# Patient Record
Sex: FEMALE | Race: WHITE | NOT HISPANIC OR LATINO | RURAL
[De-identification: names, ages, dates, MRNs, and addresses within clinical notes are randomized per-mention and may not be internally consistent; named-entity substitution may affect disease eponyms.]

---

## 2021-03-22 RX ORDER — CYCLOBENZAPRINE HCL 10 MG
10 TABLET ORAL 3 TIMES DAILY PRN
Qty: 90 TABLET | Refills: 5 | Status: SHIPPED | OUTPATIENT
Start: 2021-03-22 | End: 2021-04-01

## 2021-08-02 ENCOUNTER — OFFICE VISIT (OUTPATIENT)
Dept: FAMILY MEDICINE | Facility: CLINIC | Age: 51
End: 2021-08-02
Payer: OTHER GOVERNMENT

## 2021-08-02 DIAGNOSIS — Z11.52 ENCOUNTER FOR SCREENING FOR COVID-19: Primary | ICD-10-CM

## 2021-08-02 PROCEDURE — 99202 OFFICE O/P NEW SF 15 MIN: CPT | Mod: GC,,, | Performed by: FAMILY MEDICINE

## 2021-08-02 PROCEDURE — 87635 SARS-COV-2 (COVID-19) QUALITATIVE PCR: ICD-10-PCS | Mod: ,,, | Performed by: CLINICAL MEDICAL LABORATORY

## 2021-08-02 PROCEDURE — 87635 SARS-COV-2 COVID-19 AMP PRB: CPT | Mod: ,,, | Performed by: CLINICAL MEDICAL LABORATORY

## 2021-08-02 PROCEDURE — 99202 PR OFFICE/OUTPT VISIT, NEW, LEVL II, 15-29 MIN: ICD-10-PCS | Mod: GC,,, | Performed by: FAMILY MEDICINE

## 2021-08-03 LAB — SARS-COV-2 RNA RESP QL NAA+PROBE: NEGATIVE

## 2021-12-02 RX ORDER — NEBIVOLOL 5 MG/1
5 TABLET ORAL DAILY
Qty: 90 TABLET | Refills: 4 | Status: SHIPPED | OUTPATIENT
Start: 2021-12-02 | End: 2022-10-13 | Stop reason: SDUPTHER

## 2021-12-02 RX ORDER — VALSARTAN AND HYDROCHLOROTHIAZIDE 320; 12.5 MG/1; MG/1
1 TABLET, FILM COATED ORAL DAILY
Qty: 90 TABLET | Refills: 3 | Status: SHIPPED | OUTPATIENT
Start: 2021-12-02 | End: 2022-10-13 | Stop reason: SDUPTHER

## 2022-10-13 DIAGNOSIS — I10 HYPERTENSION, UNSPECIFIED TYPE: Primary | ICD-10-CM

## 2022-10-13 RX ORDER — VALSARTAN AND HYDROCHLOROTHIAZIDE 320; 12.5 MG/1; MG/1
1 TABLET, FILM COATED ORAL DAILY
Qty: 90 TABLET | Refills: 3 | Status: SHIPPED | OUTPATIENT
Start: 2022-10-13 | End: 2023-04-03 | Stop reason: SDUPTHER

## 2022-10-13 RX ORDER — NEBIVOLOL 5 MG/1
5 TABLET ORAL DAILY
Qty: 90 TABLET | Refills: 4 | Status: SHIPPED | OUTPATIENT
Start: 2022-10-13 | End: 2023-04-03 | Stop reason: SDUPTHER

## 2022-10-19 ENCOUNTER — OFFICE VISIT (OUTPATIENT)
Dept: PRIMARY CARE CLINIC | Facility: CLINIC | Age: 52
End: 2022-10-19
Payer: COMMERCIAL

## 2022-10-19 VITALS
WEIGHT: 190 LBS | DIASTOLIC BLOOD PRESSURE: 88 MMHG | RESPIRATION RATE: 18 BRPM | HEART RATE: 86 BPM | HEIGHT: 64 IN | OXYGEN SATURATION: 98 % | BODY MASS INDEX: 32.44 KG/M2 | SYSTOLIC BLOOD PRESSURE: 138 MMHG

## 2022-10-19 DIAGNOSIS — Z23 NEED FOR VACCINATION: Primary | ICD-10-CM

## 2022-10-19 DIAGNOSIS — Z00.00 ROUTINE GENERAL MEDICAL EXAMINATION AT A HEALTH CARE FACILITY: ICD-10-CM

## 2022-10-19 PROCEDURE — 90686 PR FLU VACCINE, QIIV4, NO PRSV, 0.5 ML, IM: ICD-10-PCS | Mod: ,,, | Performed by: FAMILY MEDICINE

## 2022-10-19 PROCEDURE — 90686 IIV4 VACC NO PRSV 0.5 ML IM: CPT | Mod: ,,, | Performed by: FAMILY MEDICINE

## 2022-10-19 PROCEDURE — 90471 FLU VACCINE (QUAD) GREATER THAN OR EQUAL TO 3YO PRESERVATIVE FREE IM: ICD-10-PCS | Mod: ,,, | Performed by: FAMILY MEDICINE

## 2022-10-19 PROCEDURE — 1159F MED LIST DOCD IN RCRD: CPT | Mod: ,,, | Performed by: FAMILY MEDICINE

## 2022-10-19 PROCEDURE — 3075F PR MOST RECENT SYSTOLIC BLOOD PRESS GE 130-139MM HG: ICD-10-PCS | Mod: ,,, | Performed by: FAMILY MEDICINE

## 2022-10-19 PROCEDURE — 99396 PREV VISIT EST AGE 40-64: CPT | Mod: 25,,, | Performed by: FAMILY MEDICINE

## 2022-10-19 PROCEDURE — 1159F PR MEDICATION LIST DOCUMENTED IN MEDICAL RECORD: ICD-10-PCS | Mod: ,,, | Performed by: FAMILY MEDICINE

## 2022-10-19 PROCEDURE — 3079F PR MOST RECENT DIASTOLIC BLOOD PRESSURE 80-89 MM HG: ICD-10-PCS | Mod: ,,, | Performed by: FAMILY MEDICINE

## 2022-10-19 PROCEDURE — 90471 IMMUNIZATION ADMIN: CPT | Mod: ,,, | Performed by: FAMILY MEDICINE

## 2022-10-19 PROCEDURE — 3079F DIAST BP 80-89 MM HG: CPT | Mod: ,,, | Performed by: FAMILY MEDICINE

## 2022-10-19 PROCEDURE — 3075F SYST BP GE 130 - 139MM HG: CPT | Mod: ,,, | Performed by: FAMILY MEDICINE

## 2022-10-19 PROCEDURE — 99396 FLU VACCINE (QUAD) GREATER THAN OR EQUAL TO 3YO PRESERVATIVE FREE IM: ICD-10-PCS | Mod: 25,,, | Performed by: FAMILY MEDICINE

## 2022-10-19 RX ORDER — PHENTERMINE HYDROCHLORIDE 37.5 MG/1
37.5 TABLET ORAL
Qty: 30 TABLET | Refills: 0 | Status: SHIPPED | OUTPATIENT
Start: 2022-10-19 | End: 2022-11-18

## 2022-10-19 NOTE — PROGRESS NOTES
Subjective:      Patient ID: Gunner Saleh is a 52 y.o. female.    Chief Complaint: Healthy You    Gunner Saleh a 52 y.o. female presents for follow up on all regular problems which are reviewed and discussed.     Problem List Items Addressed This Visit          ID    Need for vaccination - Primary    Relevant Orders    Influenza - Quadrivalent *Preferred* (6 months+) (PF) (Completed)       Other    Routine general medical examination at a health care facility       Past Medical History:  Past Medical History:   Diagnosis Date    HTN (hypertension), benign      Past Surgical History:   Procedure Laterality Date    HYSTERECTOMY  2005     Review of patient's allergies indicates:  No Known Allergies  Current Outpatient Medications on File Prior to Visit   Medication Sig Dispense Refill    nebivoloL (BYSTOLIC) 5 MG Tab Take 1 tablet (5 mg total) by mouth once daily. 90 tablet 4    valsartan-hydrochlorothiazide (DIOVAN-HCT) 320-12.5 mg per tablet Take 1 tablet by mouth once daily. 90 tablet 3     No current facility-administered medications on file prior to visit.     Social History     Socioeconomic History    Marital status:    Tobacco Use    Smoking status: Former     Packs/day: 0.25     Types: Cigarettes    Smokeless tobacco: Never    Tobacco comments:     Quit over 10 years ago   Substance and Sexual Activity    Alcohol use: Never    Drug use: Never    Sexual activity: Yes     Partners: Male     Birth control/protection: None     Family History   Problem Relation Age of Onset    Arthritis Mother     Hypertension Mother     Diabetes Father         Type 2    Hypertension Father        Review of Systems   Constitutional: Negative.  Negative for activity change, appetite change, chills, diaphoresis and unexpected weight change.   HENT: Negative.  Negative for congestion, ear pain, hearing loss, postnasal drip, rhinorrhea and trouble swallowing.    Eyes:  Positive for visual disturbance. Negative for  "discharge and itching.   Respiratory:  Negative for chest tightness, shortness of breath and wheezing.    Cardiovascular:  Negative for chest pain and palpitations.   Gastrointestinal:  Negative for abdominal pain, blood in stool, constipation, diarrhea and vomiting.   Endocrine: Negative for polydipsia and polyuria.   Genitourinary:  Negative for difficulty urinating, dysuria, frequency, hematuria and menstrual problem.   Musculoskeletal:  Negative for arthralgias, back pain, joint swelling and neck pain.   Neurological:  Negative for weakness and headaches.   Psychiatric/Behavioral:  Negative for confusion and dysphoric mood.      Objective:     /88 (BP Location: Left arm, Patient Position: Sitting, BP Method: Large (Manual))   Pulse 86   Resp 18   Ht 5' 4" (1.626 m)   Wt 86.2 kg (190 lb)   SpO2 98%   BMI 32.61 kg/m²     Physical Exam  Constitutional:       Appearance: Normal appearance. She is obese.   HENT:      Head: Normocephalic and atraumatic.      Right Ear: External ear normal.      Left Ear: External ear normal.      Nose: Nose normal.      Mouth/Throat:      Mouth: Mucous membranes are moist.      Pharynx: Oropharynx is clear.   Eyes:      Pupils: Pupils are equal, round, and reactive to light.   Cardiovascular:      Rate and Rhythm: Normal rate and regular rhythm.      Heart sounds: Normal heart sounds.   Pulmonary:      Effort: Pulmonary effort is normal.      Breath sounds: Normal breath sounds.   Abdominal:      Palpations: Abdomen is soft.   Musculoskeletal:      Cervical back: Normal range of motion and neck supple.   Skin:     General: Skin is warm and dry.   Neurological:      General: No focal deficit present.      Mental Status: She is alert and oriented to person, place, and time. Mental status is at baseline.   Psychiatric:         Mood and Affect: Mood normal.         Behavior: Behavior normal.         Thought Content: Thought content normal.         Judgment: Judgment normal. "       1. Need for vaccination    2. Routine general medical examination at a health care facility        Plan:     Problem List Items Addressed This Visit          ID    Need for vaccination - Primary    Relevant Orders    Influenza - Quadrivalent *Preferred* (6 months+) (PF) (Completed)       Other    Routine general medical examination at a health care facility     No follow-ups on file.  Adipex risks discussed  fu 1m    I am having Gunner Saleh maintain her valsartan-hydrochlorothiazide and nebivoloL.    Gunner was seen today for healthy you.    Diagnoses and all orders for this visit:    Need for vaccination  -     Influenza - Quadrivalent *Preferred* (6 months+) (PF)    Routine general medical examination at a health care facility       [unfilled]  Orders Placed This Encounter   Procedures    Influenza - Quadrivalent *Preferred* (6 months+) (PF)

## 2023-01-23 PROBLEM — Z00.00 ROUTINE GENERAL MEDICAL EXAMINATION AT A HEALTH CARE FACILITY: Status: RESOLVED | Noted: 2022-10-19 | Resolved: 2023-01-23

## 2023-04-03 RX ORDER — VALSARTAN AND HYDROCHLOROTHIAZIDE 320; 12.5 MG/1; MG/1
1 TABLET, FILM COATED ORAL DAILY
Qty: 90 TABLET | Refills: 3 | Status: SHIPPED | OUTPATIENT
Start: 2023-04-03 | End: 2023-05-07 | Stop reason: SDUPTHER

## 2023-04-03 RX ORDER — NEBIVOLOL 5 MG/1
5 TABLET ORAL DAILY
Qty: 90 TABLET | Refills: 4 | Status: SHIPPED | OUTPATIENT
Start: 2023-04-03 | End: 2023-05-07 | Stop reason: SDUPTHER

## 2023-05-08 RX ORDER — VALSARTAN AND HYDROCHLOROTHIAZIDE 320; 12.5 MG/1; MG/1
1 TABLET, FILM COATED ORAL DAILY
Qty: 90 TABLET | Refills: 3 | Status: SHIPPED | OUTPATIENT
Start: 2023-05-08 | End: 2024-05-07

## 2023-05-08 RX ORDER — NEBIVOLOL 5 MG/1
5 TABLET ORAL DAILY
Qty: 90 TABLET | Refills: 4 | Status: SHIPPED | OUTPATIENT
Start: 2023-05-08 | End: 2024-05-07

## 2023-05-08 RX ORDER — CYCLOBENZAPRINE HCL 10 MG
10 TABLET ORAL 3 TIMES DAILY PRN
Qty: 90 TABLET | Refills: 5 | Status: SHIPPED | OUTPATIENT
Start: 2023-05-08 | End: 2023-05-18

## 2023-09-19 ENCOUNTER — PATIENT OUTREACH (OUTPATIENT)
Dept: ADMINISTRATIVE | Facility: HOSPITAL | Age: 53
End: 2023-09-19

## 2023-09-19 NOTE — PROGRESS NOTES
.Population Health Review...  Per [employer] website, insurance is active and pt is listed on the attributed list needing a healthy you performed in 2023  HY scheduled for 10/16/2023

## 2023-10-17 ENCOUNTER — PATIENT OUTREACH (OUTPATIENT)
Dept: ADMINISTRATIVE | Facility: HOSPITAL | Age: 53
End: 2023-10-17

## 2023-11-13 ENCOUNTER — PATIENT OUTREACH (OUTPATIENT)
Dept: ADMINISTRATIVE | Facility: HOSPITAL | Age: 53
End: 2023-11-13

## 2023-11-13 NOTE — PROGRESS NOTES
Population Health Chart Review & Patient Outreach Details      Per [employer] website, insurance is active and pt is listed on the attributed list needing a healthy you performed in   Pt needs appt for hy asap,  sent to PES to schedule via one note      Updates Requested / Reviewed:     []  Care Everywhere    []     []  External Sources ([facility], [facility], [facility], etc.)    [] [facility]   [] [facility]   [] Other:    []  Care Team Updated   []  Removed  or Duplicate Orders   []  Immunization Reconciliation Completed / Queried    [] [place]   [] [place]   [] [place]   [] [place]      Health Maintenance Topics Addressed and Outreach Outcomes / Actions Taken:             Breast Cancer Screening []  Mammogram Order Placed    []  Mammogram Screening Scheduled    []  External Records Requested & Care Team Updated if Applicable    []  External Records Uploaded & Care Team Updated if Applicable    []  Pt Declined Scheduling Mammogram    []  Pt Will Schedule with External Provider / Order Routed & Care Team Updated if Applicable              Cervical Cancer Screening []  Pap Smear Scheduled in Primary Care or OBGYN    []  External Records Requested & Care Team Updated if Applicable       []  External Records Uploaded, Care Team Updated, & History Updated if Applicable    []  Patient Declined Scheduling Pap Smear    []  Patient Will Schedule with External Provider & Care Team Updated if Applicable                  Colorectal Cancer Screening []  Colonoscopy Case Request / Referral / Home Test Order Placed    []  External Records Requested & Care Team Updated if Applicable    []  External Records Uploaded, Care Team Updated, & History Updated if Applicable    []  Patient Declined Completing Colon Cancer Screening    []  Patient Will Schedule with External Provider & Care Team Updated if Applicable    []  Fit Kit Mailed (add the SmartPhrase under additional notes)    []  Reminded Patient to Complete Home Test                 Diabetic Eye Exam []  Eye Exam Screening Order Placed    []  Eye Camera Scheduled or Optometry/Ophthalmology Referral Placed    []  External Records Requested & Care Team Updated if Applicable    []  External Records Uploaded, Care Team Updated, & History Updated if Applicable    []  Patient Declined Scheduling Eye Exam    []  Patient Will Schedule with External Provider & Care Team Updated if Applicable             Blood Pressure Control []  Primary Care Follow Up Visit Scheduled     []  Remote Blood Pressure Reading Captured    []  Patient Declined Remote Reading or Scheduling Appt - Escalated to PCP    []  Patient Will Call Back or Send Portal Message with Reading                 HbA1c & Other Labs []  Overdue Lab(s) Ordered    []  Overdue Lab(s) Scheduled    []  External Records Uploaded & Care Team Updated if Applicable    []  Primary Care Follow Up Visit Scheduled     []  Reminded Patient to Complete A1c Home Test    []  Patient Declined Scheduling Labs or Will Call Back to Schedule    []  Patient Will Schedule with External Provider / Order Routed, & Care Team Updated if Applicable           Primary Care Appointment []  Primary Care Appt Scheduled    []  Patient Declined Scheduling or Will Call Back to Schedule    []  Pt Established with External Provider, Updated Care Team, & Informed Pt to Notify Payor if Applicable           Medication Adherence /    Statin Use []  Primary Care Appointment Scheduled    []  Patient Reminded to  Prescription    []  Patient Declined, Provider Notified if Needed    []  Sent Provider Message to Review to Evaluate Pt for Statin, Add Exclusion Dx Codes, Document   Exclusion in Problem List, Change Statin Intensity Level to Moderate or High Intensity if Applicable                Osteoporosis Screening []  Dexa Order Placed    []  Dexa Appointment Scheduled    []  External Records Requested & Care Team Updated    []  External Records Uploaded, Care Team Updated, &  History Updated if Applicable    []  Patient Declined Scheduling Dexa or Will Call Back to Schedule    []  Patient Will Schedule with External Provider / Order Routed & Care Team Updated if Applicable       Additional Notes:

## 2024-05-07 ENCOUNTER — TELEPHONE (OUTPATIENT)
Dept: PRIMARY CARE CLINIC | Facility: CLINIC | Age: 54
End: 2024-05-07
Payer: COMMERCIAL

## 2024-05-07 NOTE — TELEPHONE ENCOUNTER
Left voice message for patient to call and schedule appt.will be needing new rx for meds sent to pharmacy and routine lab work.

## 2024-05-15 ENCOUNTER — OFFICE VISIT (OUTPATIENT)
Dept: PRIMARY CARE CLINIC | Facility: CLINIC | Age: 54
End: 2024-05-15
Payer: COMMERCIAL

## 2024-05-15 VITALS
WEIGHT: 200 LBS | OXYGEN SATURATION: 97 % | DIASTOLIC BLOOD PRESSURE: 84 MMHG | BODY MASS INDEX: 34.15 KG/M2 | HEIGHT: 64 IN | RESPIRATION RATE: 18 BRPM | SYSTOLIC BLOOD PRESSURE: 136 MMHG | HEART RATE: 92 BPM

## 2024-05-15 DIAGNOSIS — Z00.00 ROUTINE GENERAL MEDICAL EXAMINATION AT A HEALTH CARE FACILITY: Primary | ICD-10-CM

## 2024-05-15 DIAGNOSIS — Z13.1 SCREENING FOR DIABETES MELLITUS: ICD-10-CM

## 2024-05-15 DIAGNOSIS — Z13.220 SCREENING FOR LIPOID DISORDERS: Primary | ICD-10-CM

## 2024-05-15 PROCEDURE — 1159F MED LIST DOCD IN RCRD: CPT | Mod: ,,, | Performed by: FAMILY MEDICINE

## 2024-05-15 PROCEDURE — 3079F DIAST BP 80-89 MM HG: CPT | Mod: ,,, | Performed by: FAMILY MEDICINE

## 2024-05-15 PROCEDURE — 3008F BODY MASS INDEX DOCD: CPT | Mod: ,,, | Performed by: FAMILY MEDICINE

## 2024-05-15 PROCEDURE — 99396 PREV VISIT EST AGE 40-64: CPT | Mod: ,,, | Performed by: FAMILY MEDICINE

## 2024-05-15 PROCEDURE — 3075F SYST BP GE 130 - 139MM HG: CPT | Mod: ,,, | Performed by: FAMILY MEDICINE

## 2024-05-15 RX ORDER — CONJUGATED ESTROGENS AND MEDROXYPROGESTERONE ACETATE .3; 1.5 MG/1; MG/1
1 TABLET, SUGAR COATED ORAL DAILY
Qty: 30 TABLET | Refills: 11 | Status: SHIPPED | OUTPATIENT
Start: 2024-05-15 | End: 2025-05-15

## 2024-05-15 NOTE — PROGRESS NOTES
Subjective:      Patient ID: Gunner Saleh is a 54 y.o. female.    Chief Complaint: Healthy You    Gunner Saleh a 54 y.o. female presents for follow up on all regular problems which are reviewed and discussed.     Problem List Items Addressed This Visit    None  Visit Diagnoses       Routine general medical examination at a health care facility    -  Primary    Relevant Orders    Cologuard Screening (Multitarget Stool DNA)            Past Medical History:  Past Medical History:   Diagnosis Date    HTN (hypertension), benign      Past Surgical History:   Procedure Laterality Date    HYSTERECTOMY  2005     Review of patient's allergies indicates:  No Known Allergies  Current Outpatient Medications on File Prior to Visit   Medication Sig Dispense Refill    cyclobenzaprine (FLEXERIL) 10 MG tablet Take 1 tablet (10 mg total) by mouth 3 (three) times daily as needed. 90 tablet 4    nebivoloL (BYSTOLIC) 5 MG Tab Take 1 tablet (5 mg total) by mouth once daily. 90 tablet 4    valsartan-hydrochlorothiazide (DIOVAN-HCT) 320-12.5 mg per tablet Take 1 tablet by mouth once daily. 90 tablet 3     No current facility-administered medications on file prior to visit.     Social History     Socioeconomic History    Marital status:    Tobacco Use    Smoking status: Former     Current packs/day: 0.25     Types: Cigarettes    Smokeless tobacco: Never    Tobacco comments:     Quit over 10 years ago   Substance and Sexual Activity    Alcohol use: Never    Drug use: Never    Sexual activity: Yes     Partners: Male     Birth control/protection: None     Social Determinants of Health     Financial Resource Strain: Low Risk  (5/15/2024)    Overall Financial Resource Strain (CARDIA)     Difficulty of Paying Living Expenses: Not very hard   Food Insecurity: No Food Insecurity (5/15/2024)    Hunger Vital Sign     Worried About Running Out of Food in the Last Year: Never true     Ran Out of Food in the Last Year: Never true  "  Transportation Needs: No Transportation Needs (5/15/2024)    PRAPARE - Transportation     Lack of Transportation (Medical): No     Lack of Transportation (Non-Medical): No   Physical Activity: Insufficiently Active (5/15/2024)    Exercise Vital Sign     Days of Exercise per Week: 3 days     Minutes of Exercise per Session: 30 min   Stress: No Stress Concern Present (5/15/2024)    [employer] - Occupational Stress Questionnaire     Feeling of Stress : Only a little   Housing Stability: Unknown (5/15/2024)    Housing Stability Vital Sign     Unable to Pay for Housing in the Last Year: No     Family History   Problem Relation Name Age of Onset    Arthritis Mother Fabiola Dasilva     Hypertension Mother Fabiola Dasilva     Diabetes Father Alexx Dasilva         Type 2    Hypertension Father Alexx Dasilva        Review of Systems   Constitutional: Negative.  Negative for activity change, appetite change, chills and diaphoresis.   HENT: Negative.  Negative for congestion, ear pain, hearing loss and postnasal drip.    Eyes:  Negative for itching.   Respiratory:  Negative for chest tightness and shortness of breath.    Cardiovascular:  Negative for chest pain.   Gastrointestinal:  Negative for abdominal pain.   Endocrine: Negative for polydipsia.   Genitourinary:  Negative for frequency.   Musculoskeletal:  Negative for back pain.   Neurological:  Negative for headaches.     Objective:     /84 (BP Location: Left arm, Patient Position: Sitting, BP Method: Large (Manual))   Pulse 92   Resp 18   Ht 5' 4" (1.626 m)   Wt 90.7 kg (200 lb)   SpO2 97%   BMI 34.33 kg/m²     Physical Exam  Constitutional:       Appearance: Normal appearance. She is obese.   HENT:      Head: Normocephalic and atraumatic.      Right Ear: External ear normal.      Left Ear: External ear normal.      Nose: Nose normal.      Mouth/Throat:      Mouth: Mucous membranes are moist.      Pharynx: Oropharynx is clear.   Eyes:      Pupils: " Pupils are equal, round, and reactive to light.   Cardiovascular:      Rate and Rhythm: Normal rate and regular rhythm.      Heart sounds: Normal heart sounds. No murmur heard.     No gallop.   Pulmonary:      Effort: Pulmonary effort is normal. No respiratory distress.      Breath sounds: Normal breath sounds. No wheezing or rales.   Abdominal:      Palpations: Abdomen is soft.   Musculoskeletal:      Cervical back: Normal range of motion and neck supple.   Skin:     General: Skin is warm and dry.   Neurological:      General: No focal deficit present.      Mental Status: She is alert and oriented to person, place, and time. Mental status is at baseline.   Psychiatric:         Mood and Affect: Mood normal.         Behavior: Behavior normal.         Thought Content: Thought content normal.         Judgment: Judgment normal.   Assessment:     1. Routine general medical examination at a health care facility        Plan:     Problem List Items Addressed This Visit    None  Visit Diagnoses       Routine general medical examination at a health care facility    -  Primary    Relevant Orders    Cologuard Screening (Multitarget Stool DNA)          No follow-ups on file.  Risks benefits discussed  Trial prempro  Fu 1-2m    I am having Gunner Saleh start on PREMPRO. I am also having her maintain her valsartan-hydrochlorothiazide, nebivoloL, and cyclobenzaprine.    Gunner was seen today for healthy you.    Diagnoses and all orders for this visit:    Routine general medical examination at a health care facility  -     Cologuard Screening (Multitarget Stool DNA); Future  -     Cologuard Screening (Multitarget Stool DNA)    Other orders  -     estrogen, conjugated,-medroxyprogesterone 0.3-1.5 mg (PREMPRO) 0.3-1.5 mg per tablet; Take 1 tablet by mouth once daily.      Medications Ordered This Encounter   Medications    estrogen, conjugated,-medroxyprogesterone 0.3-1.5 mg (PREMPRO) 0.3-1.5 mg per tablet     Sig: Take 1 tablet by  mouth once daily.     Dispense:  30 tablet     Refill:  11     [unfilled]  Orders Placed This Encounter   Procedures    Cologuard Screening (Multitarget Stool DNA)     Standing Status:   Future     Number of Occurrences:   1     Standing Expiration Date:   8/13/2025

## 2024-08-05 RX ORDER — VALSARTAN AND HYDROCHLOROTHIAZIDE 320; 12.5 MG/1; MG/1
1 TABLET, FILM COATED ORAL DAILY
Qty: 90 TABLET | Refills: 3 | Status: SHIPPED | OUTPATIENT
Start: 2024-08-05 | End: 2025-08-05

## 2024-08-05 RX ORDER — CYCLOBENZAPRINE HCL 10 MG
10 TABLET ORAL 3 TIMES DAILY PRN
Qty: 90 TABLET | Refills: 5 | Status: SHIPPED | OUTPATIENT
Start: 2024-08-05

## 2024-08-05 RX ORDER — NEBIVOLOL 5 MG/1
5 TABLET ORAL DAILY
Qty: 90 TABLET | Refills: 3 | Status: SHIPPED | OUTPATIENT
Start: 2024-08-05

## 2024-09-04 RX ORDER — SEMAGLUTIDE 0.25 MG/.5ML
0.25 INJECTION, SOLUTION SUBCUTANEOUS
Qty: 2 ML | Refills: 1 | Status: SHIPPED | OUTPATIENT
Start: 2024-09-04

## 2024-09-04 RX ORDER — SEMAGLUTIDE 0.5 MG/.5ML
0.5 INJECTION, SOLUTION SUBCUTANEOUS
Qty: 2 ML | Refills: 5 | Status: SHIPPED | OUTPATIENT
Start: 2024-09-04

## 2024-09-05 PROBLEM — I10 PRIMARY HYPERTENSION: Status: ACTIVE | Noted: 2024-09-05

## 2024-09-06 ENCOUNTER — PATIENT MESSAGE (OUTPATIENT)
Dept: OTHER | Facility: OTHER | Age: 54
End: 2024-09-06

## 2024-09-16 ENCOUNTER — OFFICE VISIT (OUTPATIENT)
Dept: INTERNAL MEDICINE | Facility: CLINIC | Age: 54
End: 2024-09-16

## 2024-09-16 DIAGNOSIS — E66.9 OBESITY, UNSPECIFIED CLASSIFICATION, UNSPECIFIED OBESITY TYPE, UNSPECIFIED WHETHER SERIOUS COMORBIDITY PRESENT: Primary | ICD-10-CM

## 2024-09-16 PROCEDURE — 99499 UNLISTED E&M SERVICE: CPT | Mod: 95,,,

## 2024-09-16 RX ORDER — SEMAGLUTIDE 0.5 MG/.5ML
0.5 INJECTION, SOLUTION SUBCUTANEOUS
Qty: 2 ML | Refills: 0 | Status: ACTIVE | OUTPATIENT
Start: 2024-10-14

## 2024-09-16 RX ORDER — SEMAGLUTIDE 1 MG/.5ML
1 INJECTION, SOLUTION SUBCUTANEOUS
Qty: 2 ML | Refills: 0 | Status: ACTIVE | OUTPATIENT
Start: 2024-11-11

## 2024-09-16 RX ORDER — SEMAGLUTIDE 0.25 MG/.5ML
0.25 INJECTION, SOLUTION SUBCUTANEOUS
Qty: 2 ML | Refills: 0 | Status: ACTIVE | OUTPATIENT
Start: 2024-09-16

## 2024-09-16 NOTE — PATIENT INSTRUCTIONS
Wegovy Patient Education  Savings Card  Follow this link to sign up for your Wegovy savings card. You will need this information when the [facility] contacts you to help pay for your prescription.  Wegovy Savings Card    Dosing Schedule      Choosing an Injection Site and Using your Pen       - Pens are stored in the refrigerator and can be removed 20-30 minutes before the injection to allow the medication to come to room temperature to avoid irritation, burning, or bruising with injection.     What to Expect      Rare, but Serious Side Effects  - Wegovy may cause pancreatitis or gallstones. If you experience severe abdominal pain that may radiate to the back and may or may not be accompanied by vomiting, you are encouraged to seek medical attention to assess your symptoms.     Reproduction, Pregnancy, and Lactation Considerations  - Wegovy is not recommended for use in patients who are currently pregnant or breastfeeding.   - If you plan to become pregnant, the use of this medication is not recommended at the time of conception. It is recommended that this medication should be discontinued at least 2 months prior to conception.     Patient's using Oral Contraceptives  - Use of medications like Wegovy may decrease the effectiveness of your oral hormonal contraceptives.   - You are encouraged to add a barrier method of contraception for 4 weeks after initiation and for 4 weeks after each dose titration of Wegovy to minimize this potential risk.     Missed or Changing Your Dosing Schedule  - If you miss a dose of Wegovy, take it as soon as possible, within 5 days of your scheduled dose. If more than 5 days have passed, skip the missed dose and take your next dose on your regularly scheduled day.  - If you would like to change the day of the week you take your Wegovy dose, make sure there are at least 2 days between doses.

## 2024-09-16 NOTE — PROGRESS NOTES
Patient ID: Gunner Saleh is a 54 y.o. White female    Subjective  Chief Complaint: patient presents for medical weight loss management.    Contraindications to GLP-1 receptor agonist therapy:   No personal or family history of MTC, personal history of MEN2, history of allergic reaction while taking a GLP-1 receptor agonist, and history of pancreatitis while taking a GLP-1 receptor agonist     Co-morbidities: HTN    History of weight loss therapy:  None. Is interested in starting Wegovy today.    Weight loss history:  Current weight:    9/13/2024   Recent Readings    Weight (lbs) 198 lb    BMI 33.98 BMI        % weight loss since GLP-1 initiation: 0    Objective  Lab Results   Component Value Date     10/13/2022     Lab Results   Component Value Date    K 4.2 10/13/2022     Lab Results   Component Value Date     10/13/2022     Lab Results   Component Value Date    CO2 31 10/13/2022     Lab Results   Component Value Date    BUN 13 10/13/2022     Lab Results   Component Value Date     05/15/2024    GLU 99 10/13/2022     Lab Results   Component Value Date    CALCIUM 9.6 10/13/2022     Lab Results   Component Value Date    PROT 7.7 10/13/2022     Lab Results   Component Value Date    ALBUMIN 3.9 10/13/2022     Lab Results   Component Value Date    BILITOT 0.4 10/13/2022     Lab Results   Component Value Date    AST 12 (L) 10/13/2022     Lab Results   Component Value Date    ALT 28 10/13/2022     Lab Results   Component Value Date    ANIONGAP 9 10/13/2022     Lab Results   Component Value Date    CREATININE 0.72 10/13/2022     Lab Results   Component Value Date    EGFRNORACEVR 101 10/13/2022     Assessment/Plan  -Pt qualifies for GLP-1 RA therapy based on BMI greater than or equal to 30 kg/m2  -Initiate Wegovy 0.25 mg once weekly for 1 month  -Then increase to Wegovy 0.5 mg once weekly for 1 month  -Then increase to Wegovy 1 mg once weekly  -RTC in 3 months      Patient consented to pharmacist  management via collaborative practice.

## 2024-09-21 ENCOUNTER — PATIENT MESSAGE (OUTPATIENT)
Dept: ADMINISTRATIVE | Facility: OTHER | Age: 54
End: 2024-09-21

## 2024-10-02 ENCOUNTER — PATIENT OUTREACH (OUTPATIENT)
Facility: HOSPITAL | Age: 54
End: 2024-10-02
Payer: COMMERCIAL

## 2024-10-02 NOTE — PROGRESS NOTES
Population Health Chart Review & Patient Outreach Details      Further Action Needed If Patient Returns Outreach:      10/2/24 mammogram report no answer via telephone note to patient portal message [id number]      Updates Requested / Reviewed:     []  Care Everywhere    []     []  External Sources ([facility], [facility], [facility], etc.)    [] [facility]   [] [facility]   [] Other:    []  Care Team Updated   []  Removed  or Duplicate Orders   []  Immunization Reconciliation Completed / Queried    [] [place]   [] [place]   [] [place]   [] [place]      Health Maintenance Topics Addressed and Outreach Outcomes / Actions Taken:             Breast Cancer Screening []  Mammogram Order Placed    []  Mammogram Screening Scheduled    []  External Records Requested & Care Team Updated if Applicable    []  External Records Uploaded & Care Team Updated if Applicable    []  Pt Declined Scheduling Mammogram    []  Pt Will Schedule with External Provider / Order Routed & Care Team Updated if Applicable              Cervical Cancer Screening []  Pap Smear Scheduled in Primary Care or OBGYN    []  External Records Requested & Care Team Updated if Applicable       []  External Records Uploaded, Care Team Updated, & History Updated if Applicable    []  Patient Declined Scheduling Pap Smear    []  Patient Will Schedule with External Provider & Care Team Updated if Applicable                  Colorectal Cancer Screening []  Colonoscopy Case Request / Referral / Home Test Order Placed    []  External Records Requested & Care Team Updated if Applicable    []  External Records Uploaded, Care Team Updated, & History Updated if Applicable    []  Patient Declined Completing Colon Cancer Screening    []  Patient Will Schedule with External Provider & Care Team Updated if Applicable    []  Fit Kit Mailed (add the SmartPhrase under additional notes)    []  Reminded Patient to Complete Home Test                Diabetic Eye Exam []  Eye Exam  Screening Order Placed    []  Eye Camera Scheduled or Optometry/Ophthalmology Referral Placed    []  External Records Requested & Care Team Updated if Applicable    []  External Records Uploaded, Care Team Updated, & History Updated if Applicable    []  Patient Declined Scheduling Eye Exam    []  Patient Will Schedule with External Provider & Care Team Updated if Applicable             Blood Pressure Control []  Primary Care Follow Up Visit Scheduled     []  Remote Blood Pressure Reading Captured    []  Patient Declined Remote Reading or Scheduling Appt - Escalated to PCP    []  Patient Will Call Back or Send Portal Message with Reading                 HbA1c & Other Labs []  Overdue Lab(s) Ordered    []  Overdue Lab(s) Scheduled    []  External Records Uploaded & Care Team Updated if Applicable    []  Primary Care Follow Up Visit Scheduled     []  Reminded Patient to Complete A1c Home Test    []  Patient Declined Scheduling Labs or Will Call Back to Schedule    []  Patient Will Schedule with External Provider / Order Routed, & Care Team Updated if Applicable           Primary Care Appointment []  Primary Care Appt Scheduled    []  Patient Declined Scheduling or Will Call Back to Schedule    []  Pt Established with External Provider, Updated Care Team, & Informed Pt to Notify Payor if Applicable           Medication Adherence /    Statin Use []  Primary Care Appointment Scheduled    []  Patient Reminded to  Prescription    []  Patient Declined, Provider Notified if Needed    []  Sent Provider Message to Review to Evaluate Pt for Statin, Add Exclusion Dx Codes, Document   Exclusion in Problem List, Change Statin Intensity Level to Moderate or High Intensity if Applicable                Osteoporosis Screening []  Dexa Order Placed    []  Dexa Appointment Scheduled    []  External Records Requested & Care Team Updated    []  External Records Uploaded, Care Team Updated, & History Updated if Applicable    []   Patient Declined Scheduling Dexa or Will Call Back to Schedule    []  Patient Will Schedule with External Provider / Order Routed & Care Team Updated if Applicable       Additional Notes:

## 2024-10-02 NOTE — LETTER
10/2/24  Dear Ms. Saleh:    Your [facility] primary care team is dedicated to assisting you achieve your health goals.  In order to do so, scheduling your routine screenings and tests are key to your good health.  Our records indicate you may be overdue for your screening mammogram.  Mammography screening can help find breast cancer at an early stage, when it is most likely to be successfully treated.    We encourage you to schedule your appointment at any of our [facility] imaging locations.  To schedule  your mammogram please contact your primary care clinic for scheduling or you can contact myself at [phone]    If you recently had your mammogram outside of [facility], please notify your primary care team so we can update your health record.       Sincerely,  Your [facility] Team  Narda Erickson Lpn-CCC

## 2024-11-22 NOTE — PROGRESS NOTES
Patient ID: Gunner Saleh is a 54 y.o. White female    Subjective  Chief Complaint: patient presents for medical weight loss management.    Co-morbidities: HTN    HPI: Patient [facility] with [facility] in September 2024 and is currently managed on Wegovy 1 mg. Pt has completed 2 doses of this strength.    Tolerance to current therapy:  Denies nausea, vomiting, diarrhea, constipation, abdominal pain  Endorses  belching    Weight loss history:  Starting weight:    9/13/2024   Recent Readings    Weight (lbs) 198 lb    BMI 33.98 BMI    Current weight:    11/16/2024   Recent Readings    Weight (lbs) 188.2 lb    BMI 32.3 BMI    % weight loss since GLP-1 initiation: 4.9 %    Objective  Lab Results   Component Value Date     10/13/2022     Lab Results   Component Value Date    K 4.2 10/13/2022     Lab Results   Component Value Date     10/13/2022     Lab Results   Component Value Date    CO2 31 10/13/2022     Lab Results   Component Value Date    BUN 13 10/13/2022     Lab Results   Component Value Date     05/15/2024    GLU 99 10/13/2022     Lab Results   Component Value Date    CALCIUM 9.6 10/13/2022     Lab Results   Component Value Date    PROT 7.7 10/13/2022     Lab Results   Component Value Date    ALBUMIN 3.9 10/13/2022     Lab Results   Component Value Date    BILITOT 0.4 10/13/2022     Lab Results   Component Value Date    AST 12 (L) 10/13/2022     Lab Results   Component Value Date    ALT 28 10/13/2022     Lab Results   Component Value Date    ANIONGAP 9 10/13/2022     Lab Results   Component Value Date    CREATININE 0.72 10/13/2022     Lab Results   Component Value Date    EGFRNORACEVR 101 10/13/2022     Assessment/Plan  - Increase to Wegovy 1.7 mg x 4 weeks once completed with Wegovy 1 mg  - Then increase to Wegovy 2.4 mg SQ weekly  - RTC in 3 months for follow-up evaluation    Patient consented to pharmacist management via collaborative practice.

## 2024-11-25 ENCOUNTER — OFFICE VISIT (OUTPATIENT)
Dept: INTERNAL MEDICINE | Facility: CLINIC | Age: 54
End: 2024-11-25

## 2024-11-25 ENCOUNTER — PATIENT MESSAGE (OUTPATIENT)
Dept: INTERNAL MEDICINE | Facility: CLINIC | Age: 54
End: 2024-11-25

## 2024-11-25 DIAGNOSIS — E66.811 OBESITY, CLASS I, BMI 30-34.9: Primary | ICD-10-CM

## 2024-11-25 PROCEDURE — 99499 UNLISTED E&M SERVICE: CPT | Mod: 95,,,

## 2024-11-25 RX ORDER — SEMAGLUTIDE 1.7 MG/.75ML
1.7 INJECTION, SOLUTION SUBCUTANEOUS
Qty: 3 ML | Refills: 0 | Status: SHIPPED | OUTPATIENT
Start: 2024-11-25 | End: 2024-11-25 | Stop reason: CLARIF

## 2024-11-25 RX ORDER — SEMAGLUTIDE 2.4 MG/.75ML
2.4 INJECTION, SOLUTION SUBCUTANEOUS
Qty: 3 ML | Refills: 2 | Status: ACTIVE | OUTPATIENT
Start: 2024-11-25

## 2024-11-25 RX ORDER — SEMAGLUTIDE 2.4 MG/.75ML
2.4 INJECTION, SOLUTION SUBCUTANEOUS
Qty: 3 ML | Refills: 2 | Status: SHIPPED | OUTPATIENT
Start: 2024-11-25 | End: 2024-11-25 | Stop reason: CLARIF

## 2024-11-25 RX ORDER — SEMAGLUTIDE 1.7 MG/.75ML
1.7 INJECTION, SOLUTION SUBCUTANEOUS
Qty: 3 ML | Refills: 0 | Status: ACTIVE | OUTPATIENT
Start: 2024-11-25

## 2024-12-05 ENCOUNTER — PATIENT MESSAGE (OUTPATIENT)
Dept: ADMINISTRATIVE | Facility: OTHER | Age: 54
End: 2024-12-05

## 2025-01-07 ENCOUNTER — PATIENT MESSAGE (OUTPATIENT)
Dept: ADMINISTRATIVE | Facility: OTHER | Age: 55
End: 2025-01-07

## 2025-01-27 ENCOUNTER — PATIENT MESSAGE (OUTPATIENT)
Dept: ADMINISTRATIVE | Facility: OTHER | Age: 55
End: 2025-01-27

## 2025-03-24 ENCOUNTER — PATIENT MESSAGE (OUTPATIENT)
Dept: ADMINISTRATIVE | Facility: OTHER | Age: 55
End: 2025-03-24
Payer: COMMERCIAL

## 2025-03-24 NOTE — PROGRESS NOTES
Patient ID: Gunner Saleh is a 54 y.o. White female    Subjective  Chief Complaint: patient presents for medical weight loss management.    Co-morbidities: HTN    HPI: Patient continued [facility] with [facility] in November 2024 and is currently managed on Wegovy 2.4 mg.     Tolerance to current therapy:  Denies nausea, vomiting, diarrhea, constipation, abdominal pain    Weight loss history:  Starting weight:    9/13/2024   Recent Readings    Weight (lbs) 198 lb    BMI 33.98 BMI    Current weight:    3/16/2025   Recent Readings    Weight (lbs) 169.8 lb    BMI 29.14 BMI    % weight loss since GLP-1 initiation: 14.2 %    Objective  Lab Results   Component Value Date     10/13/2022     Lab Results   Component Value Date    K 4.2 10/13/2022     Lab Results   Component Value Date     10/13/2022     Lab Results   Component Value Date    CO2 31 10/13/2022     Lab Results   Component Value Date    BUN 13 10/13/2022     Lab Results   Component Value Date     05/15/2024    GLU 99 10/13/2022     Lab Results   Component Value Date    CALCIUM 9.6 10/13/2022     Lab Results   Component Value Date    PROT 7.7 10/13/2022     Lab Results   Component Value Date    ALBUMIN 3.9 10/13/2022     Lab Results   Component Value Date    BILITOT 0.4 10/13/2022     Lab Results   Component Value Date    AST 12 (L) 10/13/2022     Lab Results   Component Value Date    ALT 28 10/13/2022     Lab Results   Component Value Date    ANIONGAP 9 10/13/2022     Lab Results   Component Value Date    CREATININE 0.72 10/13/2022     Lab Results   Component Value Date    EGFRNORACEVR 101 10/13/2022     Assessment/Plan  - Continue Wegovy 2.4  mg SQ weekly  - RTC in 6 months for follow-up evaluation      Patient consented to pharmacist management via collaborative practice.

## 2025-03-25 ENCOUNTER — PATIENT MESSAGE (OUTPATIENT)
Dept: INTERNAL MEDICINE | Facility: CLINIC | Age: 55
End: 2025-03-25

## 2025-03-25 ENCOUNTER — OFFICE VISIT (OUTPATIENT)
Dept: INTERNAL MEDICINE | Facility: CLINIC | Age: 55
End: 2025-03-25
Payer: COMMERCIAL

## 2025-03-25 PROCEDURE — 99499 UNLISTED E&M SERVICE: CPT | Mod: 95,,,

## 2025-03-25 RX ORDER — SEMAGLUTIDE 2.4 MG/.75ML
2.4 INJECTION, SOLUTION SUBCUTANEOUS
Qty: 3 ML | Refills: 5 | Status: ACTIVE | OUTPATIENT
Start: 2025-03-25

## 2025-03-27 ENCOUNTER — PATIENT MESSAGE (OUTPATIENT)
Dept: ADMINISTRATIVE | Facility: OTHER | Age: 55
End: 2025-03-27
Payer: COMMERCIAL

## 2025-04-15 ENCOUNTER — OFFICE VISIT (OUTPATIENT)
Dept: DERMATOLOGY | Facility: CLINIC | Age: 55
End: 2025-04-15
Payer: COMMERCIAL

## 2025-04-15 DIAGNOSIS — L57.8 OTHER SKIN CHANGES DUE TO CHRONIC EXPOSURE TO NONIONIZING RADIATION: Primary | ICD-10-CM

## 2025-04-15 DIAGNOSIS — L82.1 SEBORRHEIC KERATOSES: ICD-10-CM

## 2025-04-15 DIAGNOSIS — D48.9 NEOPLASM OF UNCERTAIN BEHAVIOR: ICD-10-CM

## 2025-04-15 DIAGNOSIS — D22.9 MULTIPLE BENIGN NEVI: ICD-10-CM

## 2025-04-15 PROCEDURE — 88305 TISSUE EXAM BY PATHOLOGIST: CPT | Mod: TC,SUR | Performed by: DERMATOLOGY

## 2025-04-15 PROCEDURE — 88305 TISSUE EXAM BY PATHOLOGIST: CPT | Mod: 26,,, | Performed by: PATHOLOGY

## 2025-04-15 RX ORDER — CONJUGATED ESTROGENS AND MEDROXYPROGESTERONE ACETATE .3; 1.5 MG/1; MG/1
1 TABLET, SUGAR COATED ORAL DAILY
Qty: 30 TABLET | Refills: 6 | Status: SHIPPED | OUTPATIENT
Start: 2025-04-15 | End: 2026-04-15

## 2025-04-15 NOTE — TELEPHONE ENCOUNTER
----- Message from Madonna sent at 4/15/2025 11:36 AM CDT -----  Regarding: Sooner Appt  Who Called: Gunner Johnson is requesting a sooner appointment.Preferred Method of Contact: Phone CallPatient's Preferred Phone Number on File: [phone] Best Call Back Number, if different:Additional Information:

## 2025-04-15 NOTE — PROGRESS NOTES
[facility]   Gabriela Raza MD    Patient Name: Gunner Saleh  Patient YOB: 1970   Date of Service: 4/15/25    CC: Full Skin Exam    HPI: Gunner Saleh is a 54 y.o. female presents today for a full skin exam.  Patient has not been seen by a dermatologist in the past and dermatologic history includes no hx of skin cancer. Patient is concerned today about a lesion located on the left shoulder.  It has been present for 1 year(s). It has not been treated in the past.      Past Medical History:   Diagnosis Date    HTN (hypertension), benign     Obesity      Past Surgical History:   Procedure Laterality Date    HYSTERECTOMY  2005     Review of patient's allergies indicates:  No Known Allergies  Current Medications[1]    ROS: A focused review of systems was obtained and negative.     Exam: A full skin exam was performed including scalp, hair, face, neck, chest, back, abdomen, right arm, left arm, right hand, left hand, nails, right leg, and left leg.  All areas examined were normal expect as per below in assessment and plan.  General Appearance of the patient is well developed and well nourished.  Orientation: alert and oriented x 3.  Mood and affect: pleasant.    Assessment:   The primary encounter diagnosis was Other skin changes due to chronic exposure to nonionizing radiation. Diagnoses of Neoplasm of uncertain behavior, Multiple benign nevi, and Seborrheic keratoses were also pertinent to this visit.    Plan:        Seborrheic Keratosis (L82.1)  - Stuck-on, warty, greasy brown papule with pseudo-horn cysts scattered on the trunk and extremities    Plan: Counseling.  I counseled the patient regarding the following:  Skin Care: Seborrheic Keratoses are benign. No treatment is necessary.  Expectations: Seborrheic Keratoses are benign warty growths. Patients get more of them as they age    Plan: Reassurance      Neoplasm of Uncertain Behavior (D48.5)  - pink keratotic nodule located on the left  anterior shoulder  Ddx includes: SCC vs BCC    Plan: Counseling.  I counseled the patient regarding the following:  Instructions: Neoplasms of Uncertain Behavior can be observed, biopsied or surgically removed depending on the  level of clinical suspicion.  Instructions: Neoplasms of Uncertain Behavior can be observed, biopsied or surgically removed depending on the  level of clinical suspicion.  Contact Office if: patient develops any new lesions that fail to heal, ulcerate or bleed.    Plan: Biopsy by Shave Method.  Location (1): left anterior shoulder  Written consent was obtained and risks were reviewed including but not  limited to scarring, infection, bleeding, scabbing, incomplete removal, nerve damage and allergy to anesthesia.  The area was prepped with Chloraprep. Local anesthesia was obtained with approximately 0.5cc of 1% lidocaine  with epinephrine. A biopsy by shave method to the level of the dermis (sent for H and E) was performed using  a Dermablade on the above location. Aluminum chloride was used for hemostasis. Following the biopsy  Petrolatum and a bandage were applied. Patient will be notified of biopsy results. However, patient instructed to  call the office if not contacted within 2 weeks.      Other Skin Changes Due to Chronic Exposure of Nonionizing Radiation (L57.8)    Plan: Monitoring.     Plan: Sunscreen Recommendations.  I recommended a broad spectrum sunscreen with a SPF of 30 or higher. I explained that SPF 30 sunscreens block approximately 97 percent of the  sun's harmful rays. Sunscreens should be applied at least 15 minutes prior to expected sun exposure and then every 2 hours after that as long as  sun exposure continues. If swimming or exercising sunscreen should be reapplied every 45 minutes to an hour after getting wet or sweating. One  ounce, or the equivalent of a shot glass full of sunscreen, is adequate to protect the skin not covered by a bathing suit. I also recommended a  lip  balm with a sunscreen as well. Sun protective clothing can be used in lieu of sunscreen but must be worn the entire time you are exposed to the  sun's rays.    Benign Nevus (D22.72)  - Dome shaped regular papules and scattered brown regular macules    Plan: Reassurance.    Plan: Counseling.  I counseled the patient regarding the following:  Instructions: Monthly self-skin checks to monitor for any changes in moles are recommended.  Expectations: Benign Nevi are pigmented nests of cells within the skin. No treatment is necessary.  Contact Office if: Any moles change in size, shape or color; itch, burn or bleed.      Follow up in about 6 months (around 10/15/2025).    Gabriela Raza MD           [1]   Current Outpatient Medications:     cyclobenzaprine (FLEXERIL) 10 MG tablet, Take 1 tablet (10 mg total) by mouth 3 (three) times daily as needed for muscle spasms., Disp: 90 tablet, Rfl: 5    estrogen, conjugated,-medroxyprogesterone 0.3-1.5 mg (PREMPRO) 0.3-1.5 mg per tablet, Take 1 tablet by mouth once daily., Disp: 30 tablet, Rfl: 11    ibuprofen (ADVIL,MOTRIN) 200 MG tablet, Take 200 mg by mouth every 6 (six) hours as needed for Pain., Disp: , Rfl:     nebivoloL (BYSTOLIC) 5 MG Tab, Take 1 tablet (5 mg total) by mouth once daily., Disp: 90 tablet, Rfl: 3    semaglutide, weight loss, (WEGOVY) 2.4 mg/0.75 mL PnIj, Inject 2.4 mg into the skin every 7 days., Disp: 3 mL, Rfl: 5    valsartan-hydrochlorothiazide (DIOVAN-HCT) 320-12.5 mg per tablet, Take 1 tablet by mouth once daily., Disp: 90 tablet, Rfl: 3

## 2025-04-17 ENCOUNTER — RESULTS FOLLOW-UP (OUTPATIENT)
Dept: DERMATOLOGY | Facility: CLINIC | Age: 55
End: 2025-04-17
Payer: COMMERCIAL

## 2025-04-17 ENCOUNTER — TELEPHONE (OUTPATIENT)
Dept: DERMATOLOGY | Facility: CLINIC | Age: 55
End: 2025-04-17
Payer: COMMERCIAL

## 2025-04-17 LAB
ESTROGEN SERPL-MCNC: NORMAL PG/ML
INSULIN SERPL-ACNC: NORMAL U[IU]/ML
LAB AP GROSS DESCRIPTION: NORMAL
LAB AP LABORATORY NOTES: NORMAL
LAB AP SPEC A DDX: NORMAL
LAB AP SPEC A MORPHOLOGY: NORMAL
T3RU NFR SERPL: NORMAL %

## 2025-04-17 NOTE — TELEPHONE ENCOUNTER
Call to patient. Path results given at this time. Excision scheduled.     ----- Message from Elma sent at 4/17/2025 12:00 PM CDT -----  Who Called: Ugnner DelfinoPatient is returning phone callWho Left Message for Patient: Dallin the patient know what this is regarding?: test resultsPreferred Method of Contact: Phone CallPatient's Preferred Phone Number on File: [phone]

## 2025-04-22 ENCOUNTER — PROCEDURE VISIT (OUTPATIENT)
Dept: DERMATOLOGY | Facility: CLINIC | Age: 55
End: 2025-04-22
Payer: COMMERCIAL

## 2025-04-22 VITALS — SYSTOLIC BLOOD PRESSURE: 124 MMHG | HEART RATE: 85 BPM | DIASTOLIC BLOOD PRESSURE: 82 MMHG

## 2025-04-22 DIAGNOSIS — C44.619 BASAL CELL CARCINOMA (BCC) OF SKIN OF LEFT UPPER EXTREMITY INCLUDING SHOULDER: Primary | ICD-10-CM

## 2025-04-22 PROCEDURE — 88305 TISSUE EXAM BY PATHOLOGIST: CPT | Mod: 26,,, | Performed by: PATHOLOGY

## 2025-04-22 PROCEDURE — 88305 TISSUE EXAM BY PATHOLOGIST: CPT | Mod: TC,SUR | Performed by: DERMATOLOGY

## 2025-04-22 NOTE — PROGRESS NOTES
[facility]   Gabriela Raza MD    Patient Name: Gunner Saleh  Patient YOB: 1970   Date of Service: 4/22/25    CC: Excision    HPI: Gunner Saleh is a 54 y.o. female here today for excision of BCC, located on the left anterior shoulder.      Past Medical History:   Diagnosis Date    HTN (hypertension), benign     Obesity      Past Surgical History:   Procedure Laterality Date    HYSTERECTOMY  2005     Review of patient's allergies indicates:  No Known Allergies  Current Medications[1]    ROS: A focused review of systems was obtained and negative.     Exam: A focused skin exam was performed and the biopsy site was identified.  General Appearance of the patient is well developed and well nourished.  Orientation: alert and oriented x 3.  Mood and affect: pleasant.    Vitals:    04/22/25 0929   BP: 124/82   Pulse: 85       Assessment:   The encounter diagnosis was Basal cell carcinoma (BCC) of skin of left upper extremity including shoulder.    Plan:  Excision  Accession Number: [id number]  Biopsy Photograph Reviewed: Yes    Procedure Note    Location (A): Left anterior shoulder  Preop Size: 2.0 x 1.8 cm  Margin: 0.4 cm  Total Excised Diameter: 2.8 x 2.6 cm  Procedure: Excision - Elliptical  Anesthesia: local infiltration-1% lidocaine with epinephrine (12 cc)  Estimated Blood Loss: minimal  Complications: none    Repair Type: Intermediate  Repair Length: 6.2 cm    Consent was obtained from the patient. The risks and benefits to therapy were discussed in detail. Specifically, the risks of infection, scarring, bleeding, prolonged wound healing, incomplete removal, allergy to anesthesia, nerve injury and recurrence were addressed. Prior to the procedure, the treatment site was clearly identified and confirmed by the patient. All components of Universal Protocol/PAUSE Rule completed.    Procedure: The patient was placed in a comfortable position exposing the surgical site. The area was prepped with  Hibiclens and draped in the usual fashion. An elliptical excision was performed, on the above listed location The margin was drawn around the clinically apparent lesion. An elliptical shape was then drawn on the skin incorporating the lesion and margins. Incisions were then made along these lines to the appropriate tissue plane and the lesion was extirpated. A 15 blade was used. The lesion was excised to the layer of the adipose tissue, removed and sent to pathology for processing and histologic evaluation.    Intermediate layered repair was performed because closure of the subcutaneous tissue and superficial non-muscular fascia was required, because damaged skin surrounding defect made closure difficult, because extensive undermining required, and because Burow's triangles were required. Undermining was performed with blunt dissection. Hemostasis was achieved with electrocautery. The subcutaneous tissue and dermis were closed with 3-0 Vicryl (interrupted). Epidermal closure was achieved with 3-0 Prolene(interrupted). Petrolatum + dry sterile dressing were applied. I reviewed with the patient in detail post-care instructions. Patient is not to engage in any heavy lifting, exercise, or swimming for the next 14 days. Should the patient develop any fevers, chills, bleeding, severe pain patient will contact the office immediately. Suture removal in 14 days.           Follow up in about 2 weeks (around 5/6/2025) for SR .    Gabriela Raza MD           [1]   Current Outpatient Medications:     cyclobenzaprine (FLEXERIL) 10 MG tablet, Take 1 tablet (10 mg total) by mouth 3 (three) times daily as needed for muscle spasms., Disp: 90 tablet, Rfl: 5    estrogen, conjugated,-medroxyprogesterone 0.3-1.5 mg (PREMPRO) 0.3-1.5 mg per tablet, Take 1 tablet by mouth once daily., Disp: 30 tablet, Rfl: 6    ibuprofen (ADVIL,MOTRIN) 200 MG tablet, Take 200 mg by mouth every 6 (six) hours as needed for Pain., Disp: , Rfl:     nebivoloL  (BYSTOLIC) 5 MG Tab, Take 1 tablet (5 mg total) by mouth once daily., Disp: 90 tablet, Rfl: 3    semaglutide, weight loss, (WEGOVY) 2.4 mg/0.75 mL PnIj, Inject 2.4 mg into the skin every 7 days., Disp: 3 mL, Rfl: 5    valsartan-hydrochlorothiazide (DIOVAN-HCT) 320-12.5 mg per tablet, Take 1 tablet by mouth once daily., Disp: 90 tablet, Rfl: 3

## 2025-04-24 ENCOUNTER — RESULTS FOLLOW-UP (OUTPATIENT)
Dept: DERMATOLOGY | Facility: CLINIC | Age: 55
End: 2025-04-24

## 2025-04-24 LAB
ESTROGEN SERPL-MCNC: NORMAL PG/ML
INSULIN SERPL-ACNC: NORMAL U[IU]/ML
LAB AP GROSS DESCRIPTION: NORMAL
LAB AP LABORATORY NOTES: NORMAL
LAB AP SPEC A DDX: NORMAL
LAB AP SPEC A MORPHOLOGY: NORMAL
LAB AP SPEC A PROCEDURE: NORMAL
T3RU NFR SERPL: NORMAL %

## 2025-04-25 ENCOUNTER — PATIENT MESSAGE (OUTPATIENT)
Dept: ADMINISTRATIVE | Facility: OTHER | Age: 55
End: 2025-04-25
Payer: COMMERCIAL

## 2025-05-06 ENCOUNTER — CLINICAL SUPPORT (OUTPATIENT)
Dept: DERMATOLOGY | Facility: CLINIC | Age: 55
End: 2025-05-06
Payer: COMMERCIAL

## 2025-05-06 DIAGNOSIS — Z48.02 ENCOUNTER FOR REMOVAL OF SUTURES: Primary | ICD-10-CM

## 2025-07-22 ENCOUNTER — PATIENT MESSAGE (OUTPATIENT)
Dept: ADMINISTRATIVE | Facility: OTHER | Age: 55
End: 2025-07-22
Payer: COMMERCIAL

## 2025-07-29 ENCOUNTER — OFFICE VISIT (OUTPATIENT)
Dept: FAMILY MEDICINE | Facility: CLINIC | Age: 55
End: 2025-07-29
Payer: COMMERCIAL

## 2025-07-29 VITALS
DIASTOLIC BLOOD PRESSURE: 60 MMHG | OXYGEN SATURATION: 99 % | TEMPERATURE: 98 F | WEIGHT: 152 LBS | BODY MASS INDEX: 25.95 KG/M2 | HEART RATE: 102 BPM | HEIGHT: 64 IN | SYSTOLIC BLOOD PRESSURE: 112 MMHG

## 2025-07-29 DIAGNOSIS — I10 PRIMARY HYPERTENSION: ICD-10-CM

## 2025-07-29 DIAGNOSIS — Z76.89 ENCOUNTER TO ESTABLISH CARE WITH NEW DOCTOR: Primary | ICD-10-CM

## 2025-07-29 LAB
ALBUMIN SERPL BCP-MCNC: 4.1 G/DL (ref 3.5–5)
ALBUMIN/GLOB SERPL: 1 {RATIO}
ALP SERPL-CCNC: 96 U/L (ref 40–150)
ALT SERPL W P-5'-P-CCNC: 12 U/L
ANION GAP SERPL CALCULATED.3IONS-SCNC: 15 MMOL/L (ref 7–16)
AST SERPL W P-5'-P-CCNC: 18 U/L (ref 11–45)
BASOPHILS # BLD AUTO: 0.06 K/UL (ref 0–0.2)
BASOPHILS NFR BLD AUTO: 0.5 % (ref 0–1)
BILIRUB SERPL-MCNC: 0.6 MG/DL
BUN SERPL-MCNC: 15 MG/DL (ref 10–20)
BUN/CREAT SERPL: 16 (ref 6–20)
CALCIUM SERPL-MCNC: 9.7 MG/DL (ref 8.4–10.2)
CHLORIDE SERPL-SCNC: 99 MMOL/L (ref 98–107)
CO2 SERPL-SCNC: 28 MMOL/L (ref 22–29)
CREAT SERPL-MCNC: 0.96 MG/DL (ref 0.55–1.02)
DIFFERENTIAL METHOD BLD: ABNORMAL
EGFR (NO RACE VARIABLE) (RUSH/TITUS): 70 ML/MIN/1.73M2
EOSINOPHIL # BLD AUTO: 0.04 K/UL (ref 0–0.5)
EOSINOPHIL NFR BLD AUTO: 0.4 % (ref 1–4)
ERYTHROCYTE [DISTWIDTH] IN BLOOD BY AUTOMATED COUNT: 12.2 % (ref 11.5–14.5)
EST. AVERAGE GLUCOSE BLD GHB EST-MCNC: 85 MG/DL
GLOBULIN SER-MCNC: 4.2 G/DL (ref 2–4)
GLUCOSE SERPL-MCNC: 76 MG/DL (ref 74–100)
HBA1C MFR BLD HPLC: 4.6 %
HCT VFR BLD AUTO: 42.7 % (ref 38–47)
HCV AB SER QL: NORMAL
HGB BLD-MCNC: 14 G/DL (ref 12–16)
IMM GRANULOCYTES # BLD AUTO: 0.05 K/UL (ref 0–0.04)
IMM GRANULOCYTES NFR BLD: 0.4 % (ref 0–0.4)
LYMPHOCYTES # BLD AUTO: 1.59 K/UL (ref 1–4.8)
LYMPHOCYTES NFR BLD AUTO: 14.1 % (ref 27–41)
MCH RBC QN AUTO: 28.4 PG (ref 27–31)
MCHC RBC AUTO-ENTMCNC: 32.8 G/DL (ref 32–36)
MCV RBC AUTO: 86.6 FL (ref 80–96)
MONOCYTES # BLD AUTO: 0.44 K/UL (ref 0–0.8)
MONOCYTES NFR BLD AUTO: 3.9 % (ref 2–6)
MPC BLD CALC-MCNC: 10.8 FL (ref 9.4–12.4)
NEUTROPHILS # BLD AUTO: 9.12 K/UL (ref 1.8–7.7)
NEUTROPHILS NFR BLD AUTO: 80.7 % (ref 53–65)
NRBC # BLD AUTO: 0 X10E3/UL
NRBC, AUTO (.00): 0 %
PLATELET # BLD AUTO: 401 K/UL (ref 150–400)
POTASSIUM SERPL-SCNC: 2.8 MMOL/L (ref 3.5–5.1)
PROT SERPL-MCNC: 8.3 G/DL (ref 6.4–8.3)
RBC # BLD AUTO: 4.93 M/UL (ref 4.2–5.4)
SODIUM SERPL-SCNC: 139 MMOL/L (ref 136–145)
WBC # BLD AUTO: 11.3 K/UL (ref 4.5–11)

## 2025-07-29 PROCEDURE — 3074F SYST BP LT 130 MM HG: CPT | Mod: ,,, | Performed by: INTERNAL MEDICINE

## 2025-07-29 PROCEDURE — 3008F BODY MASS INDEX DOCD: CPT | Mod: ,,, | Performed by: INTERNAL MEDICINE

## 2025-07-29 PROCEDURE — 86803 HEPATITIS C AB TEST: CPT | Mod: ,,, | Performed by: CLINICAL MEDICAL LABORATORY

## 2025-07-29 PROCEDURE — 80053 COMPREHEN METABOLIC PANEL: CPT | Mod: ,,, | Performed by: CLINICAL MEDICAL LABORATORY

## 2025-07-29 PROCEDURE — 83036 HEMOGLOBIN GLYCOSYLATED A1C: CPT | Mod: ,,, | Performed by: CLINICAL MEDICAL LABORATORY

## 2025-07-29 PROCEDURE — 1159F MED LIST DOCD IN RCRD: CPT | Mod: ,,, | Performed by: INTERNAL MEDICINE

## 2025-07-29 PROCEDURE — 85025 COMPLETE CBC W/AUTO DIFF WBC: CPT | Mod: ,,, | Performed by: CLINICAL MEDICAL LABORATORY

## 2025-07-29 PROCEDURE — 99203 OFFICE O/P NEW LOW 30 MIN: CPT | Mod: ,,, | Performed by: INTERNAL MEDICINE

## 2025-07-29 PROCEDURE — 3078F DIAST BP <80 MM HG: CPT | Mod: ,,, | Performed by: INTERNAL MEDICINE

## 2025-07-29 RX ORDER — VALSARTAN AND HYDROCHLOROTHIAZIDE 320; 12.5 MG/1; MG/1
1 TABLET, FILM COATED ORAL DAILY
Qty: 90 TABLET | Refills: 3 | Status: SHIPPED | OUTPATIENT
Start: 2025-07-29 | End: 2026-07-29

## 2025-07-29 RX ORDER — NEBIVOLOL 5 MG/1
5 TABLET ORAL DAILY
Qty: 90 TABLET | Refills: 3 | Status: SHIPPED | OUTPATIENT
Start: 2025-07-29

## 2025-07-29 NOTE — PROGRESS NOTES
Subjective     Patient ID: Gunner Saleh is a 55 y.o. female.    Chief Complaint: Establish Care and Health Maintenance (Hepatitis C Screening Never done/HIV Screening Never done/TETANUS VACCINE Never done/Mammogram Never done/Hemoglobin A1c (Diabetic Prevention Screening) Never done/Shingles Vaccine(1 of 2) Never done/Pneumococcal Vaccines (Age 50+)(1 of 1 - PCV) Never done/COVID-19 Vaccine(3 - 2024-25 season) due on 09/01/2024/)    Mrs. Saleh is a 55-year-old female the presents today to establish care.  She has a past medical history of hypertension and hormone replacement therapy.  She started on Wegovy through [facility] and she has lost about 50 lb.  As a result her blood pressures have also markedly improved.  Blood pressure today initially 112/60 and on repeat it is 105/60.  She has been having some symptoms consistent with orthostasis as well.  We have discussed decreasing her valsartan-hydrochlorothiazide in half.  She is on nebivolol for blood pressure and also because she was having some PVCs.  She is due for a mammogram.  We have discussed the importance of this.  She is in contemplation stage.  She did Cologuard last year.  She is resting comfortably today in no distress.  She is afebrile and vital signs are stable.      Review of Systems   Constitutional:  Negative for appetite change, chills, fatigue and fever.   HENT:  Negative for nasal congestion, ear pain, hearing loss, sinus pressure/congestion and sore throat.    Eyes:  Negative for pain, redness and visual disturbance.   Respiratory:  Negative for apnea, cough, shortness of breath and wheezing.    Cardiovascular:  Negative for chest pain and palpitations.   Gastrointestinal:  Negative for abdominal pain, constipation, diarrhea and nausea.   Endocrine: Negative for cold intolerance, heat intolerance and polyuria.   Genitourinary:  Negative for dysuria and hematuria.   Musculoskeletal:  Negative for arthralgias, back pain, joint  swelling, myalgias and neck pain.   Integumentary:  Negative for pallor, rash and wound.   Allergic/Immunologic: Negative for immunocompromised state.   Neurological:  Negative for tremors, seizures, weakness, headaches and memory loss.   Hematological:  Negative for adenopathy.   Psychiatric/Behavioral:  Negative for confusion, dysphoric mood and sleep disturbance. The patient is not nervous/anxious.           Objective     Physical Exam  Vitals and nursing note reviewed.   Constitutional:       General: She is not in acute distress.     Appearance: Normal appearance. She is not ill-appearing.   HENT:      Head: Normocephalic and atraumatic.      Right Ear: External ear normal.      Left Ear: External ear normal.      Nose: Nose normal.      Mouth/Throat:      Pharynx: Oropharynx is clear.   Eyes:      Extraocular Movements: Extraocular movements intact.      Conjunctiva/sclera: Conjunctivae normal.      Pupils: Pupils are equal, round, and reactive to light.   Cardiovascular:      Rate and Rhythm: Normal rate and regular rhythm.      Pulses: Normal pulses.      Heart sounds: Normal heart sounds. No murmur heard.  Pulmonary:      Effort: No respiratory distress.      Breath sounds: Normal breath sounds. No wheezing or rales.   Abdominal:      General: Bowel sounds are normal.      Palpations: Abdomen is soft.   Musculoskeletal:         General: Normal range of motion.      Cervical back: Normal range of motion and neck supple.      Right lower leg: No edema.      Left lower leg: No edema.   Skin:     General: Skin is warm and dry.      Capillary Refill: Capillary refill takes less than 2 seconds.      Coloration: Skin is not pale.   Neurological:      General: No focal deficit present.      Mental Status: She is alert and oriented to person, place, and time.      Cranial Nerves: No cranial nerve deficit.      Sensory: No sensory deficit.      Motor: No weakness.      Gait: Gait normal.   Psychiatric:         Mood  and Affect: Mood normal.         Judgment: Judgment normal.            Assessment and Plan     1. Encounter to establish care with new doctor  -     Hemoglobin A1C; Future; Expected date: 07/29/2025  -     Hepatitis C Antibody; Future; Expected date: 07/29/2025    2. Primary hypertension  -     CBC Auto Differential; Future; Expected date: 07/29/2025  -     Comprehensive Metabolic Panel; Future; Expected date: 07/29/2025    3. BMI 34.0-34.9,adult    Other orders  -     valsartan-hydrochlorothiazide (DIOVAN-HCT) 320-12.5 mg per tablet; Take 1 tablet by mouth once daily. for blood pressure  Dispense: 90 tablet; Refill: 3  -     nebivoloL (BYSTOLIC) 5 MG Tab; Take 1 tablet (5 mg total) by mouth once daily.  Dispense: 90 tablet; Refill: 3        1. Patient presents today to establish care.  We are going to check some lab work today.  She has never had a mammogram.  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  We have discussed the importance breast cancer screening and she voices understanding.  She is in contemplation stage.  She did Cologuard about a year ago.  The next 1 will be due October 12, 2027.    2. Essential hypertension-blood pressure is are now on the low side of normal.  As she has lost weight her blood pressures have decreased.  We are going to cut her valsartan-hydrochlorothiazide in half.  She is currently taking 320/12.5.  She is on nebivolol 5 mg as well but part of the reason for that is due to a history of PVCs.    3. Overweight-she is on Wegovy and she has lost about 50 lb.  We are going to check some lab work today.    Billing based on low medical complexity         Follow up in about 6 months (around 1/29/2026).

## 2025-08-01 RX ORDER — POTASSIUM CHLORIDE 20 MEQ/1
20 TABLET, EXTENDED RELEASE ORAL 2 TIMES DAILY
Qty: 180 TABLET | Refills: 0 | Status: SHIPPED | OUTPATIENT
Start: 2025-08-01